# Patient Record
Sex: FEMALE | Race: WHITE
[De-identification: names, ages, dates, MRNs, and addresses within clinical notes are randomized per-mention and may not be internally consistent; named-entity substitution may affect disease eponyms.]

---

## 2019-12-27 ENCOUNTER — HOSPITAL ENCOUNTER (OUTPATIENT)
Dept: HOSPITAL 47 - ORWHC2ENDO | Age: 54
Discharge: HOME | End: 2019-12-27
Attending: INTERNAL MEDICINE
Payer: COMMERCIAL

## 2019-12-27 VITALS — RESPIRATION RATE: 16 BRPM | TEMPERATURE: 97.5 F

## 2019-12-27 VITALS — HEART RATE: 56 BPM | DIASTOLIC BLOOD PRESSURE: 83 MMHG | SYSTOLIC BLOOD PRESSURE: 145 MMHG

## 2019-12-27 VITALS — BODY MASS INDEX: 22.4 KG/M2

## 2019-12-27 DIAGNOSIS — Z91.040: ICD-10-CM

## 2019-12-27 DIAGNOSIS — Z86.010: ICD-10-CM

## 2019-12-27 DIAGNOSIS — I10: ICD-10-CM

## 2019-12-27 DIAGNOSIS — Z79.899: ICD-10-CM

## 2019-12-27 DIAGNOSIS — Z12.11: Primary | ICD-10-CM

## 2019-12-27 NOTE — P.PCN
Date of Procedure: 12/27/19


Procedure(s) Performed: 


BRIEF HISTORY: Patient is a 54-year-old pleasant female scheduled for an 

elective colonoscopy as a part of evaluation of prior prior history of colon 

polyps.  Last colonoscopy was 5 years ago.





PROCEDURE PERFORMED: Colonoscopy. 





PREOPERATIVE DIAGNOSIS: History of colon polyps. 





IV sedation per Anesthesia. 





PROCEDURE: After informed consent was obtained, the patient, was brought into 

the endoscopy unit. IV sedation was administered by Anesthesia under continuous 

monitoring.  Digital rectal examination was normal. Initially the Olympus CF-160

flexible video colonoscope was then inserted in the rectum, gradually advanced 

into the cecum without any difficulty. Careful examination was performed as the 

scope was gradually being withdrawn. Ileocecal valve and the appendiceal orifice

were visualized and appeared normal.  Prep was excellent. Mucosa of the cecum, 

ascending colon, transverse colon, descending colon, sigmoid colon, and rectum 

appeared normal. Retroflexion was performed in the rectum and no lesions were 

seen. The patient tolerated the procedure well. 





IMPRESSION: Normal-appearing colon from rectum to cecum with no evidence of 

colorectal neoplasia.





RECOMMENDATIONS:  Findings of this examination were discussed with the patient 

as well as a family.  She was advised to have a repeat surveillance colonoscopy 

in 5 years from now because of the prior history of colon polyps.

## 2025-07-11 ENCOUNTER — HOSPITAL ENCOUNTER (OUTPATIENT)
Dept: HOSPITAL 47 - ORWHC2ENDO | Age: 60
End: 2025-07-11
Attending: INTERNAL MEDICINE
Payer: COMMERCIAL

## 2025-07-11 VITALS — TEMPERATURE: 96.9 F

## 2025-07-11 VITALS — DIASTOLIC BLOOD PRESSURE: 97 MMHG | RESPIRATION RATE: 18 BRPM | HEART RATE: 75 BPM | SYSTOLIC BLOOD PRESSURE: 140 MMHG

## 2025-07-11 VITALS — BODY MASS INDEX: 22.4 KG/M2

## 2025-07-11 DIAGNOSIS — Z91.040: ICD-10-CM

## 2025-07-11 DIAGNOSIS — Z79.899: ICD-10-CM

## 2025-07-11 DIAGNOSIS — I10: ICD-10-CM

## 2025-07-11 DIAGNOSIS — Z12.11: Primary | ICD-10-CM

## 2025-07-11 DIAGNOSIS — Z79.02: ICD-10-CM

## 2025-07-11 DIAGNOSIS — D12.0: ICD-10-CM

## 2025-07-11 DIAGNOSIS — E78.5: ICD-10-CM

## 2025-07-11 PROCEDURE — 88305 TISSUE EXAM BY PATHOLOGIST: CPT

## 2025-07-11 PROCEDURE — 45380 COLONOSCOPY AND BIOPSY: CPT

## 2025-07-11 RX ADMIN — POTASSIUM CHLORIDE ONE MLS: 14.9 INJECTION, SOLUTION INTRAVENOUS at 12:12

## 2025-07-11 RX ADMIN — POTASSIUM CHLORIDE SCH MLS/HR: 14.9 INJECTION, SOLUTION INTRAVENOUS at 12:13
